# Patient Record
Sex: FEMALE | Race: BLACK OR AFRICAN AMERICAN | NOT HISPANIC OR LATINO | URBAN - METROPOLITAN AREA
[De-identification: names, ages, dates, MRNs, and addresses within clinical notes are randomized per-mention and may not be internally consistent; named-entity substitution may affect disease eponyms.]

---

## 2018-07-26 ENCOUNTER — EMERGENCY (EMERGENCY)
Facility: HOSPITAL | Age: 48
LOS: 1 days | Discharge: HOME | End: 2018-07-26
Attending: EMERGENCY MEDICINE | Admitting: EMERGENCY MEDICINE
Payer: OTHER MISCELLANEOUS

## 2018-07-26 VITALS
TEMPERATURE: 98 F | SYSTOLIC BLOOD PRESSURE: 150 MMHG | RESPIRATION RATE: 16 BRPM | DIASTOLIC BLOOD PRESSURE: 92 MMHG | HEART RATE: 67 BPM | OXYGEN SATURATION: 97 %

## 2018-07-26 PROCEDURE — 99284 EMERGENCY DEPT VISIT MOD MDM: CPT | Mod: 25

## 2018-07-26 PROCEDURE — 93010 ELECTROCARDIOGRAM REPORT: CPT

## 2018-07-26 PROCEDURE — 70450 CT HEAD/BRAIN W/O DYE: CPT | Mod: 26

## 2018-07-26 RX ORDER — SODIUM CHLORIDE 9 MG/ML
1000 INJECTION INTRAMUSCULAR; INTRAVENOUS; SUBCUTANEOUS ONCE
Qty: 0 | Refills: 0 | Status: DISCONTINUED | OUTPATIENT
Start: 2018-07-26 | End: 2018-07-26

## 2018-07-26 NOTE — ED ADULT TRIAGE NOTE - CHIEF COMPLAINT QUOTE
Patient s/p trip and fall while at work, hitting head on dresser, denies any LOC abrasion noted to forehead. Denies any LOC

## 2018-07-26 NOTE — ED ADULT NURSE NOTE - CHPI ED SYMPTOMS NEG
no tingling/no confusion/no deformity/no bleeding/no loss of consciousness/no abrasion/no numbness/no fever/no weakness/no vomiting

## 2018-07-26 NOTE — ED PROVIDER NOTE - MEDICAL DECISION MAKING DETAILS
CT head, tylenol, reassess. CT head, tylenol, reassess for trip and fall.  Neurovascular intact, no antecedent symptoms.

## 2018-07-26 NOTE — ED PROVIDER NOTE - PHYSICAL EXAMINATION
***GEN - well appearing; NAD   ***HEAD - NC/AT  ***EYES/NOSE - PEERL, EOMI, mucous membranes moist, no discharge   ***THROAT: Oral cavity and pharynx normal. No inflammation, swelling, exudate, or lesions.    ***NECK: supple, non-tender no lymphadenopathy  ***PULMONARY - CTA b/l, symmetric breath sounds.   ***CARDIAC- s1s2, RRR, no murmur  ***ABDOMEN - +BS, ND, NT, soft, no guarding, no rebound, no organomegaly  ***BACK - no CVA tenderness, Normal  spine, no spinal TTP  ***EXTREMITIES - symmetric pulses, 2+ dp, capillary refill < 2 seconds, no clubbing, no cyanosis, no edema   ***SKIN - warm, dry, no rash or bruising.  Abrasion to forehead at hairline.   ***NEUROLOGIC - a&o x3, CN 3-12 intact, sensation nl, motor 5/5 RUE/LUE/RLE/LLE gait nl,

## 2018-07-26 NOTE — ED PROVIDER NOTE - PLAN OF CARE
1) You were here for a fall.    2) Follow up with your primary doctor for further evaluation and to answer any questions you have.    3) Return to the emergency department if you experience worsening symptoms, pain, fever, chills, nausea, vomiting or other concerning symptoms.

## 2018-07-26 NOTE — ED PROVIDER NOTE - ATTENDING CONTRIBUTION TO CARE
Pt is a 46yo F with a h/o DM and on daily ASA.  Sustained a mechanical trip and fall today while at work.  _abrasion to forehead.  No LOC.  +HA and feeling dazed.  Denies any vision changes, numbness, weakness, or other concerns.  Td is UTD.   PE - agree with resident exam as above.   A/P - Head injury, on asa.  Will perform HCT to r/o ICH.  C-spine cleared by NEXUS criteria.

## 2018-07-26 NOTE — ED ADULT NURSE NOTE - OBJECTIVE STATEMENT
pt BIBA for mechanical trip and fall while at work earlier today reports HA/head pain. denies LOC, N/V, vision change. tetanus UTD. pt in NAD, A,A&Ox3, will continue to monitor. pt is on aspirin.

## 2018-07-26 NOTE — ED PROVIDER NOTE - CARE PLAN
Principal Discharge DX:	Fall, initial encounter  Assessment and plan of treatment:	1) You were here for a fall.    2) Follow up with your primary doctor for further evaluation and to answer any questions you have.    3) Return to the emergency department if you experience worsening symptoms, pain, fever, chills, nausea, vomiting or other concerning symptoms.

## 2018-07-26 NOTE — ED PROVIDER NOTE - OBJECTIVE STATEMENT
47F with past medical history diabetes mellitus, on asa presents with headache, feeling tired and abrasion to forehead after trip and fall at work.  Fell into wooden cabinet, struck forehead.  Denies loc, n/v, vision change, confusion, antecedent chest pain, shortness of breath, palpitations, headache, lightheadedness.  Tetanus last updated 5 yr ago

## 2018-07-26 NOTE — ED PROVIDER NOTE - NS ED ROS FT
Constitutional: no fever  Eyes: no conjunctivitis  Ears: no ear pain   Nose: no nasal congestion, Mouth/Throat: no throat pain, Neck: no stiffness  Cardiovascular: no chest pain  Chest: no cough  Gastrointestinal: no abdominal pain, no vomiting and diarrhea  MSK: As noted in hpi   : no dysuria  Skin: no rash  Neuro: As noted in hpi   All other review of systems negative except as noted in HPI
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
## 2018-07-30 DIAGNOSIS — R51 HEADACHE: ICD-10-CM

## 2018-07-30 DIAGNOSIS — E11.9 TYPE 2 DIABETES MELLITUS WITHOUT COMPLICATIONS: ICD-10-CM

## 2018-07-30 DIAGNOSIS — S00.81XA ABRASION OF OTHER PART OF HEAD, INITIAL ENCOUNTER: ICD-10-CM

## 2024-01-12 NOTE — ED ADULT NURSE NOTE - NEURO WDL
Alert and oriented to person, place and time, memory intact, behavior appropriate to situation, PERRL.
No indicators present

## 2024-03-22 NOTE — ED ADULT NURSE NOTE - DRUG PRE-SCREENING (DAST -1)
BP great today, con't current meds, recheck in 6 mo  
CEA crept up a bit, CT C/A/P showed the newly dx lung CA above, had colonoscopy 2/23, has f/u with Dr. Reid  
Has thyroid US ordered by Dr. Yi, variable uptake noted in thyroid gland on PET scan, will follow as well  
Just dx 2024, started radiation tx this week, following with Rad Onc, will follow  
Lab Results   Component Value Date    EGFR 54 11/03/2023    EGFR 52 09/08/2023    EGFR 53 04/15/2023    CREATININE 0.90 11/03/2023    CREATININE 0.94 09/08/2023    CREATININE 0.92 04/15/2023   GFR stable, has repeat BW ordered and urged to keep hydrated, will follow  
S/p parathyroidectomy, PTH/Ca as per Endo  
TSH and Synthroid as per Endo, clinically euthyroid except some fatigue since starting radiation, will follow  
Statement Selected